# Patient Record
Sex: MALE | Race: WHITE | HISPANIC OR LATINO
[De-identification: names, ages, dates, MRNs, and addresses within clinical notes are randomized per-mention and may not be internally consistent; named-entity substitution may affect disease eponyms.]

---

## 2019-02-19 PROBLEM — Z00.00 ENCOUNTER FOR PREVENTIVE HEALTH EXAMINATION: Status: ACTIVE | Noted: 2019-02-19

## 2019-02-22 ENCOUNTER — APPOINTMENT (OUTPATIENT)
Dept: UROLOGY | Facility: CLINIC | Age: 64
End: 2019-02-22
Payer: COMMERCIAL

## 2019-02-22 VITALS
HEIGHT: 67 IN | SYSTOLIC BLOOD PRESSURE: 152 MMHG | DIASTOLIC BLOOD PRESSURE: 90 MMHG | WEIGHT: 209 LBS | BODY MASS INDEX: 32.8 KG/M2

## 2019-02-22 DIAGNOSIS — Z86.79 PERSONAL HISTORY OF OTHER DISEASES OF THE CIRCULATORY SYSTEM: ICD-10-CM

## 2019-02-22 PROCEDURE — 99204 OFFICE O/P NEW MOD 45 MIN: CPT

## 2019-02-22 NOTE — ASSESSMENT
[FreeTextEntry1] : Very pleasant 64 year-old gentleman who presents for evaluation of BPH, screening for prostate cancer, erectile dysfunction\par -Discussed the natural history of BPH, as well as typical symptoms of an enlarged prostate. Discussed potential complications that could arise from BPH, including but not limited to urinary tract infections, bladder stones, and renal impairment.\par -Given minimal symptoms at this time, we will hold off on treatment for now\par -Discussed the rationale for screening for prostate cancer with PSA and digital rectal exam. We discussed risk factors for the development of prostate cancer.  We also discussed the natural history of prostate cancer.\par -PSA today\par -Discussed pathophysiology of erectile dysfunction\par -Trial of sildenafil\par -I discussed the risks, benefits, alternatives, and possible side effects of Viagra (sildenafil) therapy with the patient, including but not limited to headache, flushing, upset stomach, blurry vision, change in color vision, vision loss, and priapism with the patient.\par -We discussed different treatment options for erectile dysfunction\par -Follow up in one month

## 2019-02-22 NOTE — HISTORY OF PRESENT ILLNESS
[FreeTextEntry1] : Very pleasant 64-year-old gentleman who presents for evaluation of BPH, screening for prostate cancer, erectile dysfunction. Patient reports nocturia x1-2 per night. He reports a moderate urinary stream. He denies dysuria. No hematuria. No flank pain or suprapubic pain. Patient reports that he would like to be screened for prostate cancer.\par \par Additionally, patient reports erectile dysfunction. He reports that his erections are approximately 3-4/10 in rigidity and tumescence. This has been present for quite a while. He reports that he is able to have intercourse with his wife, however his erections are not very strong. He has tried over-the-counter natural supplements which have slightly improved his erections. No specific timing to his symptoms. No aggravating or alleviating factors that he knows of. [Urinary Retention] : no urinary retention [Urinary Urgency] : no urinary urgency [Urinary Frequency] : no urinary frequency [Nocturia] : no nocturia [Straining] : no straining [Weak Stream] : no weak stream [Erectile Dysfunction] : Erectile Dysfunction [Dysuria] : no dysuria [Hematuria - Gross] : no gross hematuria [Bladder Spasm] : no bladder spasm [Abdominal Pain] : no abdominal pain [Flank Pain] : no flank pain [Fever] : no fever [Fatigue] : no fatigue [Nausea] : no nausea [Anorexia] : no anorexia

## 2019-02-22 NOTE — REVIEW OF SYSTEMS
[see HPI] : see HPI [Poor quality erections] : Poor quality erections [Wake up at night to urinate  How many times?  ___] : wakes up to urinate [unfilled] times during the night [Negative] : Heme/Lymph

## 2019-03-04 LAB
PSA FREE FLD-MCNC: 37 %
PSA FREE SERPL-MCNC: 0.66 NG/ML
PSA SERPL-MCNC: 1.8 NG/ML

## 2019-03-22 ENCOUNTER — APPOINTMENT (OUTPATIENT)
Dept: UROLOGY | Facility: CLINIC | Age: 64
End: 2019-03-22
Payer: COMMERCIAL

## 2019-03-22 VITALS
WEIGHT: 210 LBS | HEART RATE: 66 BPM | HEIGHT: 65 IN | BODY MASS INDEX: 34.99 KG/M2 | DIASTOLIC BLOOD PRESSURE: 83 MMHG | SYSTOLIC BLOOD PRESSURE: 138 MMHG

## 2019-03-22 PROCEDURE — 99213 OFFICE O/P EST LOW 20 MIN: CPT

## 2019-03-22 NOTE — ASSESSMENT
[FreeTextEntry1] : Very pleasant 64-year-old gentleman who presents for followup of erectile dysfunction, BPH, screening for prostate cancer\par -We discussed his risk for prostate cancer as well, given his negative KULWANT and low PSA\par -Refill for sildenafil sent to the pharmacy\par -We discussed that he can increase the dose of sildenafil up to 100 mg; I suggested he start with 60 mg\par -Follow up in 6 months

## 2019-03-22 NOTE — HISTORY OF PRESENT ILLNESS
[FreeTextEntry1] : Very pleasant 64-year-old gentleman who presents for followup for BPH, screening for prostate cancer, erectile dysfunction. Patient reports no difficulty urinating. He reports that he used sildenafil 20 mg which produced a stronger erection. He describes his erections as proximally 5/10 in rigidity and tumescence. He denies dysuria. No hematuria. No problems with ejaculation. Recent PSA 1.8. [Urinary Retention] : no urinary retention [Urinary Urgency] : no urinary urgency [Urinary Frequency] : no urinary frequency [Nocturia] : no nocturia [Straining] : no straining [Weak Stream] : no weak stream [Erectile Dysfunction] : Erectile Dysfunction [Dysuria] : no dysuria [Hematuria - Gross] : no gross hematuria [Bladder Spasm] : no bladder spasm [Abdominal Pain] : no abdominal pain [Flank Pain] : no flank pain [Fever] : no fever [Fatigue] : no fatigue [Nausea] : no nausea [Anorexia] : no anorexia

## 2019-06-28 ENCOUNTER — APPOINTMENT (OUTPATIENT)
Dept: UROLOGY | Facility: CLINIC | Age: 64
End: 2019-06-28

## 2019-08-16 ENCOUNTER — APPOINTMENT (OUTPATIENT)
Dept: UROLOGY | Facility: CLINIC | Age: 64
End: 2019-08-16

## 2021-01-21 ENCOUNTER — TRANSCRIPTION ENCOUNTER (OUTPATIENT)
Age: 66
End: 2021-01-21

## 2021-02-18 ENCOUNTER — TRANSCRIPTION ENCOUNTER (OUTPATIENT)
Age: 66
End: 2021-02-18

## 2021-02-19 PROBLEM — Z00.00 ENCOUNTER FOR PREVENTIVE HEALTH EXAMINATION: Status: ACTIVE | Noted: 2021-02-19

## 2021-02-20 ENCOUNTER — APPOINTMENT (OUTPATIENT)
Dept: PULMONOLOGY | Facility: CLINIC | Age: 66
End: 2021-02-20
Payer: COMMERCIAL

## 2021-02-20 VITALS
RESPIRATION RATE: 16 BRPM | OXYGEN SATURATION: 95 % | DIASTOLIC BLOOD PRESSURE: 81 MMHG | HEIGHT: 64 IN | WEIGHT: 206 LBS | HEART RATE: 81 BPM | TEMPERATURE: 98.8 F | SYSTOLIC BLOOD PRESSURE: 153 MMHG | BODY MASS INDEX: 35.17 KG/M2

## 2021-02-20 DIAGNOSIS — Z86.16 PERSONAL HISTORY OF COVID-19: ICD-10-CM

## 2021-02-20 PROCEDURE — 99072 ADDL SUPL MATRL&STAF TM PHE: CPT

## 2021-02-20 PROCEDURE — 99203 OFFICE O/P NEW LOW 30 MIN: CPT

## 2021-02-21 NOTE — REVIEW OF SYSTEMS
[Fever] : no fever [Nasal Congestion] : no nasal congestion [Cough] : no cough [Chest Discomfort] : no chest discomfort [Hay Fever] : no hay fever [GERD] : no gerd

## 2021-02-21 NOTE — DISCUSSION/SUMMARY
[FreeTextEntry1] : 69 year od man who has history of COVID infection recently.  He has also received COVID vaccine afterward.\par \par he feels well but requests pulmonary evaluation as "check up".\par \par Lungs are clear  ROS is negative.\par \par PLAN\par \par CXR, post COVID\par \par PFT\par \par check COVID antibody \par \par Discussed importance of influenza vaccine\par \par Roel Mora MD

## 2021-02-21 NOTE — HISTORY OF PRESENT ILLNESS
[TextBox_4] : 69 year old man presents for evaluation of lung function. He has no specific complaints. 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  \par \par He has had prior infection with COVID 19 2 weeks ago.  He was diagnosed on positive swab from mouth.  He has not had any prior antibody testing. He presented with fatigue\par \par He had taken COVID vaccine 8 days earlier. He received second vaccine 3 days ago. \par \par His primary doctor is Jayshree Wilson MD\par \par \par PSH:\par \par none\par \par \par PMH:\par HTN\par \par \par MEDS\par \par quinapril 40 mg\par amlodipine 5 mg\par \par SH:\par \par never smoker\par \par \par ETOH:  occasional\par \par \par Occupation: retired, worked as construction\par \par No exposure to chemicals, dust, asbestos, mold\par \par lives in [place] in apartment\par \par [id number]\par \par \par ALLERGY:\par \par NKDA\par \par \par environmental/seasonal allergy:\par \par \par \par Review of Systems:\par \par No rash, skin problems\par No dry eyes\par no mouth ulcers\par no sinusitis, sinus infections, nasal obstruction\par no dysphagia\par no dry mouth\par \par no arthritis\par no joint aches\par no joint swelling\par \par \par no pneumonia\par no wheeze\par no lung cancer\par \par no CAD\par no MI\par no chest pain\par no murmur\par no CHF\par no edema\par \par no peptic ulcer or gastritis\par no GERD\par no abdominal pain\par no liver disease\par \par no Diabetes\par no thyroid disease\par no hyperlipidemia\par \par \par no bleeding\par \par no DVT or PE\par \par no kidney disease\par \par no stroke\par no seizure\par \par \par He has not had influenza vaccine\par \par he has declined\par \par \par SLEEP\par No witnessed apnea, excessive daytime sleepiness, morning headache \par \par \par some snoring\par \par \par \par \par \par \par \par  [Snoring] : snoring

## 2021-02-21 NOTE — PHYSICAL EXAM
[No Acute Distress] : no acute distress [Enlarged Base of the Tongue] : enlarged base of the tongue [III] : Mallampati Class: III [Normal Appearance] : normal appearance [No Neck Mass] : no neck mass [No JVD] : no jvd [No Resp Distress] : no resp distress [Clear to Auscultation Bilaterally] : clear to auscultation bilaterally [Benign] : benign [TextBox_44] : no bruit

## 2021-08-17 ENCOUNTER — APPOINTMENT (OUTPATIENT)
Dept: UROLOGY | Facility: CLINIC | Age: 66
End: 2021-08-17
Payer: MEDICAID

## 2021-08-17 DIAGNOSIS — N40.1 BENIGN PROSTATIC HYPERPLASIA WITH LOWER URINARY TRACT SYMPMS: ICD-10-CM

## 2021-08-17 DIAGNOSIS — N13.8 BENIGN PROSTATIC HYPERPLASIA WITH LOWER URINARY TRACT SYMPMS: ICD-10-CM

## 2021-08-17 DIAGNOSIS — N52.9 MALE ERECTILE DYSFUNCTION, UNSPECIFIED: ICD-10-CM

## 2021-08-17 DIAGNOSIS — Z12.5 ENCOUNTER FOR SCREENING FOR MALIGNANT NEOPLASM OF PROSTATE: ICD-10-CM

## 2021-08-17 PROCEDURE — 99214 OFFICE O/P EST MOD 30 MIN: CPT

## 2021-08-17 NOTE — HISTORY OF PRESENT ILLNESS
[FreeTextEntry1] : Very pleasant 66-year-old gentleman who presents for follow-up of BPH with urinary obstruction, erectile dysfunction, screening for prostate cancer.  Last PSA in February 2019 was 1.8.  He reports that his urinary symptoms are stable.  He reports no dysuria.  No hematuria.  He reports that sildenafil 20 mg has significantly improved his erections.  He would like to continue sildenafil 20 mg.

## 2021-08-17 NOTE — ASSESSMENT
[FreeTextEntry1] : Very pleasant 66-year-old gentleman who presents for follow-up of BPH, screening for prostate cancer, erectile dysfunction\par -Continue sildenafil 20 mg–refill sent to the pharmacy\par -Last PSA 1.8 in March 2019\par -Repeat PSA today\par -Follow-up in 6 months

## 2021-08-18 LAB — PSA SERPL-MCNC: 0.66 NG/ML

## 2021-11-30 ENCOUNTER — APPOINTMENT (OUTPATIENT)
Dept: SURGERY | Facility: CLINIC | Age: 66
End: 2021-11-30
Payer: MEDICAID

## 2021-11-30 PROCEDURE — 99203 OFFICE O/P NEW LOW 30 MIN: CPT

## 2022-02-18 ENCOUNTER — APPOINTMENT (OUTPATIENT)
Dept: UROLOGY | Facility: CLINIC | Age: 67
End: 2022-02-18

## 2023-11-02 ENCOUNTER — APPOINTMENT (OUTPATIENT)
Dept: SURGERY | Facility: CLINIC | Age: 68
End: 2023-11-02
Payer: MEDICARE

## 2023-11-02 VITALS
BODY MASS INDEX: 36.65 KG/M2 | OXYGEN SATURATION: 94 % | DIASTOLIC BLOOD PRESSURE: 86 MMHG | WEIGHT: 220 LBS | SYSTOLIC BLOOD PRESSURE: 157 MMHG | HEIGHT: 65 IN | HEART RATE: 72 BPM

## 2023-11-02 DIAGNOSIS — Z87.891 PERSONAL HISTORY OF NICOTINE DEPENDENCE: ICD-10-CM

## 2023-11-02 DIAGNOSIS — K80.20 CALCULUS OF GALLBLADDER W/OUT CHOLECYSTITIS W/OUT OBSTRUCTION: ICD-10-CM

## 2023-11-02 DIAGNOSIS — K42.9 UMBILICAL HERNIA W/OUT OBSTRUCTION OR GANGRENE: ICD-10-CM

## 2023-11-02 DIAGNOSIS — M62.08 SEPARATION OF MUSCLE (NONTRAUMATIC), OTHER SITE: ICD-10-CM

## 2023-11-02 PROCEDURE — 99204 OFFICE O/P NEW MOD 45 MIN: CPT

## 2023-11-02 RX ORDER — QUINAPRIL HYDROCHLORIDE 40 MG/1
40 TABLET, FILM COATED ORAL
Refills: 0 | Status: COMPLETED | COMMUNITY
End: 2023-11-02

## 2023-11-02 RX ORDER — TAMSULOSIN HYDROCHLORIDE 0.4 MG/1
CAPSULE ORAL
Refills: 0 | Status: ACTIVE | COMMUNITY

## 2023-11-02 RX ORDER — SILDENAFIL 20 MG/1
20 TABLET ORAL DAILY
Qty: 90 | Refills: 0 | Status: COMPLETED | COMMUNITY
Start: 2019-02-22 | End: 2023-11-02

## 2023-11-02 RX ORDER — LOSARTAN POTASSIUM 100 MG/1
TABLET, FILM COATED ORAL
Refills: 0 | Status: ACTIVE | COMMUNITY

## 2024-12-25 PROBLEM — F10.90 ALCOHOL USE: Status: INACTIVE | Noted: 2019-02-22

## 2025-07-17 ENCOUNTER — APPOINTMENT (OUTPATIENT)
Dept: SURGERY | Facility: CLINIC | Age: 70
End: 2025-07-17

## 2025-07-24 ENCOUNTER — APPOINTMENT (OUTPATIENT)
Dept: SURGERY | Facility: CLINIC | Age: 70
End: 2025-07-24